# Patient Record
Sex: FEMALE | URBAN - METROPOLITAN AREA
[De-identification: names, ages, dates, MRNs, and addresses within clinical notes are randomized per-mention and may not be internally consistent; named-entity substitution may affect disease eponyms.]

---

## 2017-12-18 ENCOUNTER — APPOINTMENT (RX ONLY)
Age: 22
Setting detail: DERMATOLOGY
End: 2017-12-18

## 2017-12-18 DIAGNOSIS — D22 MELANOCYTIC NEVI: ICD-10-CM

## 2017-12-18 DIAGNOSIS — L70.0 ACNE VULGARIS: ICD-10-CM

## 2017-12-18 PROBLEM — D22.71 MELANOCYTIC NEVI OF RIGHT LOWER LIMB, INCLUDING HIP: Status: ACTIVE | Noted: 2017-12-18

## 2017-12-18 PROBLEM — D22.5 MELANOCYTIC NEVI OF TRUNK: Status: ACTIVE | Noted: 2017-12-18

## 2017-12-18 PROBLEM — D22.39 MELANOCYTIC NEVI OF OTHER PARTS OF FACE: Status: ACTIVE | Noted: 2017-12-18

## 2017-12-18 PROCEDURE — 99203 OFFICE O/P NEW LOW 30 MIN: CPT

## 2017-12-18 PROCEDURE — ? OBSERVATION

## 2017-12-18 PROCEDURE — ? PRESCRIPTION

## 2017-12-18 PROCEDURE — ? PATIENT SPECIFIC COUNSELING

## 2017-12-18 PROCEDURE — ? COUNSELING

## 2017-12-18 RX ORDER — TRETINOIN 0.25 MG/G
CREAM TOPICAL
Qty: 1 | Refills: 11 | Status: ERX | COMMUNITY
Start: 2017-12-18

## 2017-12-18 RX ORDER — CLINDAMYCIN PHOSPHATE 10 MG/ML
LOTION TOPICAL
Qty: 1 | Refills: 4 | Status: ERX | COMMUNITY
Start: 2017-12-18

## 2017-12-18 RX ADMIN — CLINDAMYCIN PHOSPHATE: 10 LOTION TOPICAL at 20:03

## 2017-12-18 RX ADMIN — TRETINOIN: 0.25 CREAM TOPICAL at 20:03

## 2017-12-18 ASSESSMENT — LOCATION DETAILED DESCRIPTION DERM
LOCATION DETAILED: RIGHT SUPERIOR MEDIAL UPPER BACK
LOCATION DETAILED: RIGHT ANTERIOR DISTAL THIGH
LOCATION DETAILED: LEFT SUPERIOR LATERAL MALAR CHEEK

## 2017-12-18 ASSESSMENT — LOCATION ZONE DERM
LOCATION ZONE: FACE
LOCATION ZONE: LEG
LOCATION ZONE: TRUNK

## 2017-12-18 ASSESSMENT — LOCATION SIMPLE DESCRIPTION DERM
LOCATION SIMPLE: RIGHT THIGH
LOCATION SIMPLE: LEFT CHEEK
LOCATION SIMPLE: RIGHT UPPER BACK

## 2017-12-18 NOTE — PROCEDURE: PATIENT SPECIFIC COUNSELING
Detail Level: Simple
She really wants to avoid oral meds for her acne; in discussing her meds, we realized that she has been using Ziana only AS NEEDED which would not be expected to be helpful so we decided to give it another try (with the individual components since Ziana is so expensive).

## 2017-12-18 NOTE — HPI: PIMPLES (ACNE)
How Severe Is Your Acne?: moderate
Is This A New Presentation, Or A Follow-Up?: Acne
Additional Comments (Use Complete Sentences): Pt had improved acne when on BCP, but pt does not want to be on them.

## 2019-06-26 ENCOUNTER — APPOINTMENT (RX ONLY)
Age: 24
Setting detail: DERMATOLOGY
End: 2019-06-26

## 2019-06-26 DIAGNOSIS — L70.0 ACNE VULGARIS: ICD-10-CM

## 2019-06-26 DIAGNOSIS — L57.8 OTHER SKIN CHANGES DUE TO CHRONIC EXPOSURE TO NONIONIZING RADIATION: ICD-10-CM

## 2019-06-26 DIAGNOSIS — D22 MELANOCYTIC NEVI: ICD-10-CM

## 2019-06-26 PROBLEM — D22.5 MELANOCYTIC NEVI OF TRUNK: Status: ACTIVE | Noted: 2019-06-26

## 2019-06-26 PROBLEM — D22.61 MELANOCYTIC NEVI OF RIGHT UPPER LIMB, INCLUDING SHOULDER: Status: ACTIVE | Noted: 2019-06-26

## 2019-06-26 PROBLEM — D22.39 MELANOCYTIC NEVI OF OTHER PARTS OF FACE: Status: ACTIVE | Noted: 2019-06-26

## 2019-06-26 PROBLEM — D22.71 MELANOCYTIC NEVI OF RIGHT LOWER LIMB, INCLUDING HIP: Status: ACTIVE | Noted: 2019-06-26

## 2019-06-26 PROCEDURE — ? OBSERVATION

## 2019-06-26 PROCEDURE — ? COUNSELING

## 2019-06-26 PROCEDURE — 99214 OFFICE O/P EST MOD 30 MIN: CPT

## 2019-06-26 PROCEDURE — ? PATIENT SPECIFIC COUNSELING

## 2019-06-26 ASSESSMENT — LOCATION SIMPLE DESCRIPTION DERM
LOCATION SIMPLE: RIGHT UPPER BACK
LOCATION SIMPLE: LEFT FOREHEAD
LOCATION SIMPLE: RIGHT THIGH
LOCATION SIMPLE: RIGHT UPPER ARM
LOCATION SIMPLE: LEFT CHEEK

## 2019-06-26 ASSESSMENT — LOCATION DETAILED DESCRIPTION DERM
LOCATION DETAILED: RIGHT SUPERIOR MEDIAL UPPER BACK
LOCATION DETAILED: LEFT SUPERIOR LATERAL MALAR CHEEK
LOCATION DETAILED: RIGHT ANTERIOR DISTAL THIGH
LOCATION DETAILED: RIGHT ANTERIOR DISTAL UPPER ARM
LOCATION DETAILED: LEFT MEDIAL FOREHEAD

## 2019-06-26 ASSESSMENT — LOCATION ZONE DERM
LOCATION ZONE: LEG
LOCATION ZONE: TRUNK
LOCATION ZONE: FACE
LOCATION ZONE: ARM

## 2019-06-26 NOTE — PROCEDURE: OBSERVATION
Size Of Lesion In Cm (Optional): 0
Detail Level: Simple
Detail Level: Generalized
Detail Level: Detailed

## 2019-06-26 NOTE — PROCEDURE: PATIENT SPECIFIC COUNSELING
Detail Level: Zone
If the patient decides to have cosmetic removal of the nevi located on the chin, I advised the patient to visit a plastic surgeon. I emphasized that these nevi should be sent to pathology.

## 2019-06-26 NOTE — PROCEDURE: COUNSELING
Topical Retinoid counseling:  Patient advised to apply a pea-sized amount only at bedtime and wait 30 minutes after washing their face before applying.  If too drying, patient may add a non-comedogenic moisturizer. The patient verbalized understanding of the proper use and possible adverse effects of retinoids.  All of the patient's questions and concerns were addressed.
Doxycycline Counseling:  Patient counseled regarding possible photosensitivity and increased risk for sunburn.  Patient instructed to avoid sunlight, if possible.  When exposed to sunlight, patients should wear protective clothing, sunglasses, and sunscreen.  The patient was instructed to call the office immediately if the following severe adverse effects occur:  hearing changes, easy bruising/bleeding, severe headache, or vision changes.  The patient verbalized understanding of the proper use and possible adverse effects of doxycycline.  All of the patient's questions and concerns were addressed.
Bactrim Counseling:  I discussed with the patient the risks of sulfa antibiotics including but not limited to GI upset, allergic reaction, drug rash, diarrhea, dizziness, photosensitivity, and yeast infections.  Rarely, more serious reactions can occur including but not limited to aplastic anemia, agranulocytosis, methemoglobinemia, blood dyscrasias, liver or kidney failure, lung infiltrates or desquamative/blistering drug rashes.
Topical Clindamycin Counseling: Patient counseled that this medication may cause skin irritation or allergic reactions.  In the event of skin irritation, the patient was advised to reduce the amount of the drug applied or use it less frequently.   The patient verbalized understanding of the proper use and possible adverse effects of clindamycin.  All of the patient's questions and concerns were addressed.
Detail Level: Simple
Benzoyl Peroxide Pregnancy And Lactation Text: This medication is Pregnancy Category C. It is unknown if benzoyl peroxide is excreted in breast milk.
Azithromycin Pregnancy And Lactation Text: This medication is considered safe during pregnancy and is also secreted in breast milk.
High Dose Vitamin A Counseling: Side effects reviewed, pt to contact office should one occur.
High Dose Vitamin A Pregnancy And Lactation Text: High dose vitamin A therapy is contraindicated during pregnancy and breast feeding.
Tazorac Pregnancy And Lactation Text: This medication is not safe during pregnancy. It is unknown if this medication is excreted in breast milk.
Benzoyl Peroxide Counseling: Patient counseled that medicine may cause skin irritation and bleach clothing.  In the event of skin irritation, the patient was advised to reduce the amount of the drug applied or use it less frequently.   The patient verbalized understanding of the proper use and possible adverse effects of benzoyl peroxide.  All of the patient's questions and concerns were addressed.
Dapsone Counseling: I discussed with the patient the risks of dapsone including but not limited to hemolytic anemia, agranulocytosis, rashes, methemoglobinemia, kidney failure, peripheral neuropathy, headaches, GI upset, and liver toxicity.  Patients who start dapsone require monitoring including baseline LFTs and weekly CBCs for the first month, then every month thereafter.  The patient verbalized understanding of the proper use and possible adverse effects of dapsone.  All of the patient's questions and concerns were addressed.
Spironolactone Pregnancy And Lactation Text: This medication can cause feminization of the male fetus and should be avoided during pregnancy. The active metabolite is also found in breast milk.
Erythromycin Counseling:  I discussed with the patient the risks of erythromycin including but not limited to GI upset, allergic reaction, drug rash, diarrhea, increase in liver enzymes, and yeast infections.
Isotretinoin Pregnancy And Lactation Text: This medication is Pregnancy Category X and is considered extremely dangerous during pregnancy. It is unknown if it is excreted in breast milk.
Spironolactone Counseling: Patient advised regarding risks of diarrhea, abdominal pain, hyperkalemia, birth defects (for female patients), liver toxicity and renal toxicity. The patient may need blood work to monitor liver and kidney function and potassium levels while on therapy. The patient verbalized understanding of the proper use and possible adverse effects of spironolactone.  All of the patient's questions and concerns were addressed.
Detail Level: Generalized
Tetracycline Pregnancy And Lactation Text: This medication is Pregnancy Category D and not consider safe during pregnancy. It is also excreted in breast milk.
Azithromycin Counseling:  I discussed with the patient the risks of azithromycin including but not limited to GI upset, allergic reaction, drug rash, diarrhea, and yeast infections.
Birth Control Pills Pregnancy And Lactation Text: This medication should be avoided if pregnant and for the first 30 days post-partum.
Use Enhanced Medication Counseling?: No
Dapsone Pregnancy And Lactation Text: This medication is Pregnancy Category C and is not considered safe during pregnancy or breast feeding.
Topical Sulfur Applications Pregnancy And Lactation Text: This medication is Pregnancy Category C and has an unknown safety profile during pregnancy. It is unknown if this topical medication is excreted in breast milk.
Patient Specific Counseling (Will Not Stick From Patient To Patient): Her acne is quite mild at this point; however, if she wants treatment, I recommended a BPO wash or tretinoin (previously prescribed)
Topical Clindamycin Pregnancy And Lactation Text: This medication is Pregnancy Category B and is considered safe during pregnancy. It is unknown if it is excreted in breast milk.
Tazorac Counseling:  Patient advised that medication is irritating and drying.  Patient may need to apply sparingly and wash off after an hour before eventually leaving it on overnight.  The patient verbalized understanding of the proper use and possible adverse effects of tazorac.  All of the patient's questions and concerns were addressed.
Detail Level: Zone
Birth Control Pills Counseling: Birth Control Pill Counseling: I discussed with the patient the potential side effects of OCPs including but not limited to increased risk of stroke, heart attack, thrombophlebitis, deep venous thrombosis, hepatic adenomas, breast changes, GI upset, headaches, and depression.  The patient verbalized understanding of the proper use and possible adverse effects of OCPs. All of the patient's questions and concerns were addressed.
Topical Sulfur Applications Counseling: Topical Sulfur Counseling: Patient counseled that this medication may cause skin irritation or allergic reactions.  In the event of skin irritation, the patient was advised to reduce the amount of the drug applied or use it less frequently.   The patient verbalized understanding of the proper use and possible adverse effects of topical sulfur application.  All of the patient's questions and concerns were addressed.
Isotretinoin Counseling: Patient should get monthly blood tests, not donate blood, not drive at night if vision affected, not share medication, and not undergo elective surgery for 6 months after tx completed. Side effects reviewed, pt to contact office should one occur.
Minocycline Counseling: Patient advised regarding possible photosensitivity and discoloration of the teeth, skin, lips, tongue and gums.  Patient instructed to avoid sunlight, if possible.  When exposed to sunlight, patients should wear protective clothing, sunglasses, and sunscreen.  The patient was instructed to call the office immediately if the following severe adverse effects occur:  hearing changes, easy bruising/bleeding, severe headache, or vision changes.  The patient verbalized understanding of the proper use and possible adverse effects of minocycline.  All of the patient's questions and concerns were addressed.
Tetracycline Counseling: Patient counseled regarding possible photosensitivity and increased risk for sunburn.  Patient instructed to avoid sunlight, if possible.  When exposed to sunlight, patients should wear protective clothing, sunglasses, and sunscreen.  The patient was instructed to call the office immediately if the following severe adverse effects occur:  hearing changes, easy bruising/bleeding, severe headache, or vision changes.  The patient verbalized understanding of the proper use and possible adverse effects of tetracycline.  All of the patient's questions and concerns were addressed. Patient understands to avoid pregnancy while on therapy due to potential birth defects.
Doxycycline Pregnancy And Lactation Text: This medication is Pregnancy Category D and not consider safe during pregnancy. It is also excreted in breast milk but is considered safe for shorter treatment courses.
Bactrim Pregnancy And Lactation Text: This medication is Pregnancy Category D and is known to cause fetal risk.  It is also excreted in breast milk.
Topical Retinoid Pregnancy And Lactation Text: This medication is Pregnancy Category C. It is unknown if this medication is excreted in breast milk.
Erythromycin Pregnancy And Lactation Text: This medication is Pregnancy Category B and is considered safe during pregnancy. It is also excreted in breast milk.

## 2019-06-26 NOTE — HPI: SKIN LESION
How Severe Is Your Skin Lesion?: mild
Is This A New Presentation, Or A Follow-Up?: Moles
Which Family Member (Optional)?: Father

## 2020-06-30 ENCOUNTER — APPOINTMENT (RX ONLY)
Age: 25
Setting detail: DERMATOLOGY
End: 2020-06-30

## 2020-06-30 VITALS — TEMPERATURE: 98.2 F

## 2020-06-30 DIAGNOSIS — D22 MELANOCYTIC NEVI: ICD-10-CM

## 2020-06-30 DIAGNOSIS — L70.0 ACNE VULGARIS: ICD-10-CM | Status: IMPROVED

## 2020-06-30 DIAGNOSIS — L57.8 OTHER SKIN CHANGES DUE TO CHRONIC EXPOSURE TO NONIONIZING RADIATION: ICD-10-CM

## 2020-06-30 PROBLEM — D22.71 MELANOCYTIC NEVI OF RIGHT LOWER LIMB, INCLUDING HIP: Status: ACTIVE | Noted: 2020-06-30

## 2020-06-30 PROBLEM — D22.5 MELANOCYTIC NEVI OF TRUNK: Status: ACTIVE | Noted: 2020-06-30

## 2020-06-30 PROBLEM — D22.61 MELANOCYTIC NEVI OF RIGHT UPPER LIMB, INCLUDING SHOULDER: Status: ACTIVE | Noted: 2020-06-30

## 2020-06-30 PROBLEM — D22.39 MELANOCYTIC NEVI OF OTHER PARTS OF FACE: Status: ACTIVE | Noted: 2020-06-30

## 2020-06-30 PROCEDURE — ? COUNSELING

## 2020-06-30 PROCEDURE — ? OBSERVATION

## 2020-06-30 PROCEDURE — ? PATIENT SPECIFIC COUNSELING

## 2020-06-30 PROCEDURE — 99214 OFFICE O/P EST MOD 30 MIN: CPT

## 2020-06-30 ASSESSMENT — LOCATION DETAILED DESCRIPTION DERM
LOCATION DETAILED: LEFT SUPERIOR LATERAL MALAR CHEEK
LOCATION DETAILED: RIGHT SUPERIOR MEDIAL UPPER BACK
LOCATION DETAILED: RIGHT ANTERIOR DISTAL UPPER ARM
LOCATION DETAILED: RIGHT ANTERIOR DISTAL THIGH

## 2020-06-30 ASSESSMENT — LOCATION ZONE DERM
LOCATION ZONE: LEG
LOCATION ZONE: TRUNK
LOCATION ZONE: FACE
LOCATION ZONE: ARM

## 2020-06-30 ASSESSMENT — LOCATION SIMPLE DESCRIPTION DERM
LOCATION SIMPLE: RIGHT UPPER ARM
LOCATION SIMPLE: RIGHT THIGH
LOCATION SIMPLE: LEFT CHEEK
LOCATION SIMPLE: RIGHT UPPER BACK

## 2020-06-30 NOTE — PROCEDURE: OBSERVATION
X Size Of Lesion In Cm (Optional): 0
Detail Level: Detailed
Detail Level: Simple
Detail Level: Generalized

## 2020-06-30 NOTE — PROCEDURE: PATIENT SPECIFIC COUNSELING
If the patient decides to have cosmetic removal of the nevi located on the chin, I advised the patient to visit a plastic surgeon. I emphasized that these nevi should be sent to pathology.
Detail Level: Zone

## 2021-06-02 ENCOUNTER — APPOINTMENT (RX ONLY)
Age: 26
Setting detail: DERMATOLOGY
End: 2021-06-02

## 2021-06-02 VITALS — TEMPERATURE: 97.3 F

## 2021-06-02 DIAGNOSIS — D22 MELANOCYTIC NEVI: ICD-10-CM

## 2021-06-02 DIAGNOSIS — L57.8 OTHER SKIN CHANGES DUE TO CHRONIC EXPOSURE TO NONIONIZING RADIATION: ICD-10-CM

## 2021-06-02 PROBLEM — D22.39 MELANOCYTIC NEVI OF OTHER PARTS OF FACE: Status: ACTIVE | Noted: 2021-06-02

## 2021-06-02 PROBLEM — D22.5 MELANOCYTIC NEVI OF TRUNK: Status: ACTIVE | Noted: 2021-06-02

## 2021-06-02 PROBLEM — D22.61 MELANOCYTIC NEVI OF RIGHT UPPER LIMB, INCLUDING SHOULDER: Status: ACTIVE | Noted: 2021-06-02

## 2021-06-02 PROBLEM — D22.71 MELANOCYTIC NEVI OF RIGHT LOWER LIMB, INCLUDING HIP: Status: ACTIVE | Noted: 2021-06-02

## 2021-06-02 PROCEDURE — ? OBSERVATION

## 2021-06-02 PROCEDURE — 99213 OFFICE O/P EST LOW 20 MIN: CPT

## 2021-06-02 PROCEDURE — ? COUNSELING

## 2021-06-02 ASSESSMENT — LOCATION DETAILED DESCRIPTION DERM
LOCATION DETAILED: LEFT SUPERIOR LATERAL MALAR CHEEK
LOCATION DETAILED: RIGHT SUPERIOR MEDIAL UPPER BACK
LOCATION DETAILED: RIGHT ANTERIOR DISTAL THIGH
LOCATION DETAILED: RIGHT ANTERIOR DISTAL UPPER ARM

## 2021-06-02 ASSESSMENT — LOCATION ZONE DERM
LOCATION ZONE: TRUNK
LOCATION ZONE: ARM
LOCATION ZONE: LEG
LOCATION ZONE: FACE

## 2021-06-02 ASSESSMENT — LOCATION SIMPLE DESCRIPTION DERM
LOCATION SIMPLE: LEFT CHEEK
LOCATION SIMPLE: RIGHT THIGH
LOCATION SIMPLE: RIGHT UPPER BACK
LOCATION SIMPLE: RIGHT UPPER ARM

## 2023-02-09 NOTE — PROCEDURE: OBSERVATION
X Size Of Lesion In Cm (Optional): 0
Detail Level: Detailed
Detail Level: Generalized
Detail Level: Zone
Epidermal Autograft Text: The defect edges were debeveled with a #15 scalpel blade.  Given the location of the defect, shape of the defect and the proximity to free margins an epidermal autograft was deemed most appropriate.  Using a sterile surgical marker, the primary defect shape was transferred to the donor site. The epidermal graft was then harvested.  The skin graft was then placed in the primary defect and oriented appropriately.

## 2023-11-20 ENCOUNTER — APPOINTMENT (RX ONLY)
Age: 28
Setting detail: DERMATOLOGY
End: 2023-11-20

## 2023-11-20 DIAGNOSIS — D22 MELANOCYTIC NEVI: ICD-10-CM

## 2023-11-20 DIAGNOSIS — L57.8 OTHER SKIN CHANGES DUE TO CHRONIC EXPOSURE TO NONIONIZING RADIATION: ICD-10-CM

## 2023-11-20 DIAGNOSIS — L71.8 OTHER ROSACEA: ICD-10-CM

## 2023-11-20 PROBLEM — D22.39 MELANOCYTIC NEVI OF OTHER PARTS OF FACE: Status: ACTIVE | Noted: 2023-11-20

## 2023-11-20 PROBLEM — D22.5 MELANOCYTIC NEVI OF TRUNK: Status: ACTIVE | Noted: 2023-11-20

## 2023-11-20 PROBLEM — D22.71 MELANOCYTIC NEVI OF RIGHT LOWER LIMB, INCLUDING HIP: Status: ACTIVE | Noted: 2023-11-20

## 2023-11-20 PROBLEM — D22.61 MELANOCYTIC NEVI OF RIGHT UPPER LIMB, INCLUDING SHOULDER: Status: ACTIVE | Noted: 2023-11-20

## 2023-11-20 PROCEDURE — 99214 OFFICE O/P EST MOD 30 MIN: CPT

## 2023-11-20 PROCEDURE — ? PATIENT SPECIFIC COUNSELING

## 2023-11-20 PROCEDURE — ? PRESCRIPTION

## 2023-11-20 PROCEDURE — ? COUNSELING

## 2023-11-20 PROCEDURE — ? OBSERVATION

## 2023-11-20 RX ORDER — METRONIDAZOLE 7.5 MG/G
CREAM TOPICAL
Qty: 45 | Refills: 11 | Status: ERX | COMMUNITY
Start: 2023-11-20

## 2023-11-20 RX ADMIN — METRONIDAZOLE: 7.5 CREAM TOPICAL at 00:00

## 2023-11-20 ASSESSMENT — LOCATION ZONE DERM
LOCATION ZONE: FACE
LOCATION ZONE: LEG
LOCATION ZONE: TRUNK
LOCATION ZONE: ARM

## 2023-11-20 ASSESSMENT — LOCATION SIMPLE DESCRIPTION DERM
LOCATION SIMPLE: RIGHT CHEEK
LOCATION SIMPLE: RIGHT THIGH
LOCATION SIMPLE: RIGHT UPPER BACK
LOCATION SIMPLE: LEFT CHEEK
LOCATION SIMPLE: RIGHT UPPER ARM

## 2023-11-20 ASSESSMENT — LOCATION DETAILED DESCRIPTION DERM
LOCATION DETAILED: LEFT SUPERIOR LATERAL MALAR CHEEK
LOCATION DETAILED: RIGHT ANTERIOR DISTAL UPPER ARM
LOCATION DETAILED: RIGHT ANTERIOR DISTAL THIGH
LOCATION DETAILED: LEFT INFERIOR CENTRAL MALAR CHEEK
LOCATION DETAILED: RIGHT SUPERIOR MEDIAL UPPER BACK
LOCATION DETAILED: RIGHT CENTRAL MALAR CHEEK

## 2023-11-20 NOTE — HPI: EVALUATION OF SKIN LESION(S)
What Type Of Note Output Would You Prefer (Optional)?: Bullet Format
Hpi Title: Evaluation of Skin Lesions
How Severe Are Your Spot(S)?: moderate
Additional History: Pt also reports redness on the nose, present for years. Sometimes flares with whiteheads.

## 2023-11-20 NOTE — PROCEDURE: OBSERVATION
Size Of Lesion In Cm (Optional): 0
Detail Level: Zone
Detail Level: Generalized
Detail Level: Detailed

## 2023-11-20 NOTE — PROCEDURE: PATIENT SPECIFIC COUNSELING
I recommended using EltaMD UV Clear sunscreen.\\n\\nLaser treatment around her nose should help with her persistent redness. [facility] information was provided to the patient.
Detail Level: Detailed

## 2025-05-22 RX ORDER — METRONIDAZOLE 7.5 MG/G
CREAM TOPICAL
Qty: 45 | Refills: 11 | Status: ERX | COMMUNITY
Start: 2025-05-22

## 2025-05-22 RX ADMIN — METRONIDAZOLE: 7.5 CREAM TOPICAL at 00:00
